# Patient Record
Sex: MALE | Race: WHITE
[De-identification: names, ages, dates, MRNs, and addresses within clinical notes are randomized per-mention and may not be internally consistent; named-entity substitution may affect disease eponyms.]

---

## 2020-08-10 ENCOUNTER — HOSPITAL ENCOUNTER (EMERGENCY)
Dept: HOSPITAL 97 - ER | Age: 40
Discharge: HOME | End: 2020-08-10
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
## 2020-08-10 DIAGNOSIS — S01.01XA: Primary | ICD-10-CM
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
## 2020-08-10 DIAGNOSIS — Z88.2: ICD-10-CM

## 2020-08-10 PROCEDURE — 72125 CT NECK SPINE W/O DYE: CPT

## 2020-08-10 PROCEDURE — 70450 CT HEAD/BRAIN W/O DYE: CPT

## 2020-08-10 PROCEDURE — 90714 TD VACC NO PRESV 7 YRS+ IM: CPT

## 2020-08-10 PROCEDURE — 0JQ00ZZ REPAIR SCALP SUBCUTANEOUS TISSUE AND FASCIA, OPEN APPROACH: ICD-10-PCS

## 2020-08-10 PROCEDURE — 90471 IMMUNIZATION ADMIN: CPT

## 2020-08-10 PROCEDURE — 99283 EMERGENCY DEPT VISIT LOW MDM: CPT

## 2020-08-10 NOTE — RAD REPORT
EXAM DESCRIPTION:  CT - [id number] - 8/10/2020 2:39 pm

 

CLINICAL HISTORY:  FALL, blunt force trauma to the head and neck

 

COMPARISON:  No comparisons

 

TECHNIQUE:  Axial 5 mm thick images of the head were obtained.  Axial 2 mm thick images of the cervic
al spine were obtained with sagittal and coronal reconstruction images generated and reviewed.

 

All CT scans are performed using dose optimization technique as appropriate and may include automated
 exposure control or mA/KV adjustment according to patient size.

 

FINDINGS:  No intracranial hemorrhage, mass, edema or acute intracranial finding. No suspicion for ac
[place] infarction. No extra-axial fluid collections. Mastoid air cells and paranasal sinuses are clear. 
No globe or orbit abnormality seen.

 

Cervical body height and alignment are normal. No disk space narrowing. No fracture or acute bony abn
ormality.  Central canal detail is inherently limited.

 

No paraspinal mass or hematoma.

 

Due to technical malfunction is of all thing the PACs imaging system and the [employer]
ng system images were not immediately available for review and reports could not be initially generat
ed.

 

IMPRESSION:  Negative CT head examination for acute or significant finding.

 

Negative CT cervical spine examination for acute or significant finding.

## 2020-08-11 NOTE — EDPHYS
Physician Documentation                                                                           

 [facility]                                                                 

Name: Rush Jenkins Jr                                                                            

Age: 40 yrs                                                                                       

Sex: Male                                                                                         

: 1980                                                                                   

MRN: [id number]                                                                                   

Arrival Date: 08/10/2020                                                                          

Time: 14:01                                                                                       

Account#: [id number]                                                                            

Bed 15                                                                                            

Private MD:                                                                                       

ED Physician Michael Parker                                                                         

HPI:                                                                                              

08/10                                                                                             

14:09 This 40 yrs old  Male presents to ER via Ambulatory with complaints of head    rn  

      injury/laceration.                                                                          

14:09 The patient or guardian reports injury, a laceration, pain. The complaints affect the   rn  

      top of head. Onset: The symptoms/episode began/occurred just prior to arrival. Severity     

      of symptoms: At their worst the symptoms were mild, in the emergency department the         

      symptoms are unchanged. The patient has not experienced similar symptoms in the past.       

      Reports at work, unknown number of empty pallets fell and hit him on head approx 2-3 ft     

      height above his head, no LOC, got a little dizzy, reports mild neck pain. Remembers        

      all events. NO vomiting or focal neurological complaints. NO headache. No blood             

      thinners. .                                                                                 

                                                                                                  

Historical:                                                                                       

- Allergies:                                                                                      

14:06 Sulfa (Sulfonamide Antibiotics);                                                        ss  

- Home Meds:                                                                                      

14:06 None [Active];                                                                          ss  

                                                                                                  

- Immunization history:: Adult Immunizations up to date.                                          

- Social history:: Smoking status: Patient denies any tobacco usage or history of.                

- Family history:: not pertinent.                                                                 

- Hospitalizations: : No recent hospitalization is reported.                                      

                                                                                                  

                                                                                                  

ROS:                                                                                              

14:09 Constitutional: Negative for fever, chills, and weight loss, Eyes: Negative for injury, rn  

      pain, redness, and discharge, Neck: + neck pain Cardiovascular: Negative for chest          

      pain, palpitations, and edema, Respiratory: Negative for shortness of breath, cough,        

      wheezing, and pleuritic chest pain, Abdomen/GI: Negative for abdominal pain, nausea,        

      vomiting, diarrhea, and constipation, Back: Negative for injury and pain, MS/Extremity:     

      Negative for injury and deformity, Skin: Negative for injury, rash, and discoloration,      

      Neuro: Negative for headache, weakness, numbness, tingling, and seizure.                    

                                                                                                  

Exam:                                                                                             

14:09 Constitutional:  This is a well developed, well nourished patient who is awake, alert,  rn  

      and in no acute distress. Head/Face:  2.5 inch superficial laceration top of scalp, no      

      active bleeding, no depression Eyes:  Pupils equal round and reactive to light,             

      extra-ocular motions intact.  Lids and lashes normal.  Conjunctiva and sclera are           

      non-icteric and not injected.  Cornea within normal limits.  Periorbital areas with no      

      swelling, redness, or edema. Neck:  Ccollar in place, no midline tenderness                 

      Respiratory:  Speaking full sentences.  No increased work of breathing, no retractions      

      or nasal flaring. Skin:  Warm, dry MS/ Extremity:  Pulses equal, no cyanosis.   Neuro:      

      Awake and alert, GCS 15                                                                     

                                                                                                  

Vital Signs:                                                                                      

14:01  / 90; Pulse 87; Resp 17; Temp 98.7(O); Pulse Ox 99% on R/A; Weight 124.74 kg;    ss  

      Height 5 ft. 8 in. (172.72 cm); Pain 5/10;                                                  

14:01 Body Mass Index 41.81 (124.74 kg, 172.72 cm)                                            ss  

                                                                                                  

[place] Coma Score:                                                                               

14:09 Eye Response: spontaneous(4). Verbal Response: oriented(5). Motor Response: obeys       rn  

      commands(6). Total: 15.                                                                     

14:39 Eye Response: spontaneous(4). Verbal Response: oriented(5). Motor Response: obeys       rn  

      commands(6). Total: 15.                                                                     

                                                                                                  

Laceration:                                                                                       

14:39 Wound Repair of 2.5cm ( 1.0in ) subcutaneous laceration to top of head. Distal          rn  

      neuro/vascular/tendon intact. Wound prep: Extensive cleansing by me, Wound explored.        

      Skin closed with 3 35 W Staples using staple gun. Patient tolerated well.                   

                                                                                                  

MDM:                                                                                              

14:01 Patient medically screened.                                                             rn  

14:39 Differential diagnosis: Contusion of Laceration of Intracranial bleed- Concussion. Data rn  

      reviewed: vital signs, nurses notes, radiologic studies, CT scan, and as a result, I        

      will discharge patient. Counseling: I had a detailed discussion with the patient and/or     

      guardian regarding: the historical points, exam findings, and any diagnostic results        

      supporting the discharge/admit diagnosis, radiology results, the need for outpatient        

      follow up, to return to the emergency department if symptoms worsen or persist or if        

      there are any questions or concerns that arise at home. Response to treatment: the          

      patient's symptoms have markedly improved after treatment, and as a result, I will          

      discharge patient. Special discussion: Based on the patient's history, exam and DX          

      evaluation, there is no indication for emergent intervention or inpatient TX. It is         

      understood by the patient/guardian that if the SXs persist or worsen they need to           

      return immediately for re-evaluation. I discussed with the patient/guardian in detail       

      that at this point there is no indication for admission to the hospital. It is              

      understood, however, that if the symptoms persist or worsen the patient needs to return     

      immediately for re-evaluation.                                                              

                                                                                                  

08/10                                                                                             

14:09 Order name: Wound Care                                                                  rn  

                                                                                                  

Administered Medications:                                                                         

14:18 Drug: Tetanus-Diphtheria Toxoid Adult 0.5 ml {: [employer]. Exp:         iw  

      2023. Lot #: [id number]. } Route: IM; Site: right deltoid;                                 

17:23 Follow up: Response: No adverse reaction                                                iw  

                                                                                                  

                                                                                                  

Disposition:                                                                                      

08/10/20 14:41 Discharged to Home. Impression: Laceration without foreign body of scalp,          

  Superficial injury of head.                                                                     

- Condition is Stable.                                                                            

- Discharge Instructions: Head Injury, Adult, Stitches, Staples, or Adhesive Wound                

  Closure.                                                                                        

                                                                                                  

- Work release form, Medication Reconciliation Form, Thank You Letter, Antibiotic                 

  Education, Prescription Opioid Use form.                                                        

- Follow up: Private Physician; When: 10 days; Reason: Wound Recheck, Staple/Suture               

  removal.                                                                                        

- Problem is new.                                                                                 

- Symptoms have improved.                                                                         

                                                                                                  

                                                                                                  

                                                                                                  

Signatures:                                                                                       

Sanjuana Beck RN                     RN                                                      

Michael Parker MD MD rn Smirch, Shelby, RN RN   ss                                                   

                                                                                                  

Corrections: (The following items were deleted from the chart)                                    

14:56 14:39 Wound Repair of 2.5cm ( 1.0in ) subcutaneous laceration to top of head. Distal    rn  

      neuro/vascular/tendon intact. Wound prep: Extensive cleansing by nurse, Wound explored.     

      Skin closed with 4 35 W Staples using staple gun. Patient tolerated well. rn                

15:30 14:41 08/10/2020 14:41 Discharged to Home. Impression: Laceration without foreign body  iw  

      of scalp; Superficial injury of head. Condition is Stable. Forms are Medication             

      Reconciliation Form, Thank You Letter, Antibiotic Education, Prescription Opioid Use.       

      Follow up: Private Physician; When: 10 days; Reason: Wound Recheck, Staple/Suture           

      removal. Problem is new. Symptoms have improved. rn                                         

                                                                                                  

**************************************************************************************************

## 2020-08-11 NOTE — ER
Nurse's Notes                                                                                     

 [facility]                                                                 

Name: Rush Jenkins Jr                                                                            

Age: 40 yrs                                                                                       

Sex: Male                                                                                         

: 1980                                                                                   

MRN: [id number]                                                                                   

Arrival Date: 08/10/2020                                                                          

Time: 14:01                                                                                       

Account#: [id number]                                                                            

Bed 15                                                                                            

Private MD:                                                                                       

Diagnosis: Laceration without foreign body of scalp;Superficial injury of head                    

                                                                                                  

Presentation:                                                                                     

08/10                                                                                             

14:01 Chief complaint: EMS states: Pallet fell onto top of head while at work prior to        ss  

      arrival. Laceration to top of head, bleeding controlled at this time. C/o mild neck         

      pain. C collar in place. [place] LOC. Coronavirus screen: Client denies travel out of the     

      [place] in the last 14 days. At this time, the client does not indicate any symptoms           

      associated with coronavirus-19. Ebola Screen: Patient denies exposure to infectious         

      person. Patient denies travel to an Ebola-affected area in the 21 days before illness       

      onset. Initial Sepsis Screen: Does the patient meet any 2 criteria? No. Patient's           

      initial sepsis screen is negative. Does the patient have a suspected source of              

      infection? No. Patient's initial sepsis screen is negative. Risk Assessment: Do you         

      want to hurt yourself or someone else? Patient reports no desire to harm self or            

      others. Onset of symptoms was August 10, 2020.                                              

14:01 Method Of Arrival: Ambulatory                                                           ss  

14:01 Acuity: TIMOTEO 4                                                                             

                                                                                                  

Triage Assessment:                                                                                

15:00 General: Appears in no apparent distress. Behavior is calm, cooperative.                iw  

                                                                                                  

Historical:                                                                                       

- Allergies:                                                                                      

14:06 Sulfa (Sulfonamide Antibiotics);                                                        ss  

- Home Meds:                                                                                      

14:06 None [Active];                                                                          ss  

                                                                                                  

- Immunization history:: Adult Immunizations up to date.                                          

- Social history:: Smoking status: Patient denies any tobacco usage or history of.                

- Family history:: not pertinent.                                                                 

- Hospitalizations: : No recent hospitalization is reported.                                      

                                                                                                  

                                                                                                  

Screenin:06 Abuse screen: Denies threats or abuse. Denies injuries from another. Nutritional        ss  

      screening: No deficits noted. Tuberculosis screening: Never had TB. Fall Risk None          

      identified.                                                                                 

                                                                                                  

Assessment:                                                                                       

14:20 General: Appears in no apparent distress. Behavior is calm, cooperative. Pain:          iw  

      Complains of pain in top of head. Neuro: Level of Consciousness is awake, alert, obeys      

      commands, Oriented to person, place, time, situation, Moves all extremities. Full           

      function. Cardiovascular: Patient's skin is warm and dry. Respiratory: Respiratory          

      effort is even, unlabored, Respiratory pattern is regular, symmetrical. Derm: Skin is       

      healthy with good turgor. Musculoskeletal: Range of motion: intact in all extremities.      

      Injury Description: Laceration sustained to top of head is full thickness, 0.5 to 2.5       

      cm long, was sustained 30-60 minutes ago. a small amount of bleeding noted at this time.    

                                                                                                  

Vital Signs:                                                                                      

14:01  / 90; Pulse 87; Resp 17; Temp 98.7(O); Pulse Ox 99% on R/A; Weight 124.74 kg;    ss  

      Height 5 ft. 8 in. (172.72 cm); Pain 5/10;                                                  

14:01 Body Mass Index 41.81 (124.74 kg, 172.72 cm)                                              

                                                                                                  

Diana Coma Score:                                                                               

14:09 Eye Response: spontaneous(4). Verbal Response: oriented(5). Motor Response: obeys       rn  

      commands(6). Total: 15.                                                                     

14:39 Eye Response: spontaneous(4). Verbal Response: oriented(5). Motor Response: obeys       rn  

      commands(6). Total: 15.                                                                     

                                                                                                  

ED Course:                                                                                        

14:01 Patient arrived in ED.                                                                  ss  

14:01 Michael Parker MD is Attending Physician.                                                rn  

14:01 Arm band placed on right wrist.                                                         ss  

14:03 Triage completed.                                                                       ss  

14:04 Sanjuana Beck, RN is Primary Nurse.                                                   iw  

14:06 Patient has correct armband on for positive identification. Bed in low position. Call   ss  

      light in reach.                                                                             

15:10 Assist provider with laceration repair on top of head that was between 2.6 to 7.5 cm    iw  

      using staples. Set up tray. Performed by Michael Parker MD Patient tolerated well. Patient     

      did not have IV access during this emergency room visit.                                    

                                                                                                  

Administered Medications:                                                                         

14:18 Drug: Tetanus-Diphtheria Toxoid Adult 0.5 ml {: [employer]. Exp:         iw  

      2023. Lot #: [id number]. } Route: IM; Site: right deltoid;                                 

17:23 Follow up: Response: No adverse reaction                                                iw  

                                                                                                  

                                                                                                  

Outcome:                                                                                          

14:41 Discharge ordered by MD.                                                                rn  

15:29 Discharged to home ambulatory.                                                          iw  

15:29 Condition: good                                                                             

15:29 Discharge instructions given to patient, Instructed on discharge instructions, follow       

      up and referral plans. wound care, Demonstrated understanding of instructions,              

      follow-up care, wound care.                                                                 

15:30 Patient left the ED.                                                                    iw  

                                                                                                  

Signatures:                                                                                       

Sanjuana Beck RN RN iw Nieto, Roman, MD MD rn Smirch, Shelby, RN RN ss                                                   

                                                                                                  

Corrections: (The following items were deleted from the chart)                                    

14:04 14:01 Acuity: TIMOTEO 3 ss                                                                  ss  

                                                                                                  

**************************************************************************************************

## 2021-02-18 ENCOUNTER — HOSPITAL ENCOUNTER (INPATIENT)
Dept: HOSPITAL 97 - ER | Age: 41
LOS: 1 days | Discharge: HOME | DRG: 343 | End: 2021-02-19
Attending: SURGERY | Admitting: SURGERY
Payer: SELF-PAY

## 2021-02-18 VITALS — BODY MASS INDEX: 45.6 KG/M2

## 2021-02-18 VITALS — OXYGEN SATURATION: 98 %

## 2021-02-18 DIAGNOSIS — K35.80: Primary | ICD-10-CM

## 2021-02-18 DIAGNOSIS — Z88.1: ICD-10-CM

## 2021-02-18 DIAGNOSIS — Z88.5: ICD-10-CM

## 2021-02-18 LAB
ALBUMIN SERPL BCP-MCNC: 4.2 G/DL (ref 3.4–5)
ALP SERPL-CCNC: 69 U/L (ref 45–117)
ALT SERPL W P-5'-P-CCNC: 134 U/L (ref 12–78)
AST SERPL W P-5'-P-CCNC: 58 U/L (ref 15–37)
BUN BLD-MCNC: 13 MG/DL (ref 7–18)
GLUCOSE SERPLBLD-MCNC: 93 MG/DL (ref 74–106)
HCT VFR BLD CALC: 46.9 % (ref 39.6–49)
LIPASE SERPL-CCNC: 77 U/L (ref 73–393)
LYMPHOCYTES # SPEC AUTO: 2 K/UL (ref 0.7–4.9)
PMV BLD: 8.4 FL (ref 7.6–11.3)
POTASSIUM SERPL-SCNC: 4.5 MMOL/L (ref 3.5–5.1)
RBC # BLD: 5.34 M/UL (ref 4.33–5.43)

## 2021-02-18 PROCEDURE — 85025 COMPLETE CBC W/AUTO DIFF WBC: CPT

## 2021-02-18 PROCEDURE — 88304 TISSUE EXAM BY PATHOLOGIST: CPT

## 2021-02-18 PROCEDURE — 0DTJ4ZZ RESECTION OF APPENDIX, PERCUTANEOUS ENDOSCOPIC APPROACH: ICD-10-PCS

## 2021-02-18 PROCEDURE — 82565 ASSAY OF CREATININE: CPT

## 2021-02-18 PROCEDURE — 83690 ASSAY OF LIPASE: CPT

## 2021-02-18 PROCEDURE — 99285 EMERGENCY DEPT VISIT HI MDM: CPT

## 2021-02-18 PROCEDURE — 94010 BREATHING CAPACITY TEST: CPT

## 2021-02-18 PROCEDURE — 80048 BASIC METABOLIC PNL TOTAL CA: CPT

## 2021-02-18 PROCEDURE — 36415 COLL VENOUS BLD VENIPUNCTURE: CPT

## 2021-02-18 PROCEDURE — 96374 THER/PROPH/DIAG INJ IV PUSH: CPT

## 2021-02-18 PROCEDURE — 74177 CT ABD & PELVIS W/CONTRAST: CPT

## 2021-02-18 PROCEDURE — 80076 HEPATIC FUNCTION PANEL: CPT

## 2021-02-18 RX ADMIN — FENTANYL CITRATE ONE MCG: 50 INJECTION, SOLUTION INTRAMUSCULAR; INTRAVENOUS at 16:40

## 2021-02-18 RX ADMIN — DEXTROSE AND SODIUM CHLORIDE SCH: 5; .45 INJECTION, SOLUTION INTRAVENOUS at 14:18

## 2021-02-18 RX ADMIN — FENTANYL CITRATE ONE MCG: 50 INJECTION, SOLUTION INTRAMUSCULAR; INTRAVENOUS at 16:48

## 2021-02-18 RX ADMIN — HYDROCODONE BITARTRATE AND ACETAMINOPHEN PRN TAB: 7.5; 325 TABLET ORAL at 21:05

## 2021-02-18 RX ADMIN — PIPERACILLIN SODIUM AND TAZOBACTAM SODIUM SCH MLS: 3; .375 INJECTION, POWDER, LYOPHILIZED, FOR SOLUTION INTRAVENOUS at 21:00

## 2021-02-18 RX ADMIN — DEXTROSE AND SODIUM CHLORIDE SCH MLS: 5; .45 INJECTION, SOLUTION INTRAVENOUS at 17:56

## 2021-02-18 NOTE — EDPHYS
Physician Documentation                                                                           

 [facility]                                                                 

Name: Rush Jenkins Jr                                                                            

Age: 40 yrs                                                                                       

Sex: Male                                                                                         

: 1980                                                                                   

MRN: [id number]                                                                                   

Arrival Date: 2021                                                                          

Time: 10:01                                                                                       

Account#: [id number]                                                                            

Bed 3                                                                                             

Private MD:                                                                                       

ED Physician Sandeep Main                                                                      

HPI:                                                                                              

                                                                                             

12:27 This 40 yrs old  Male presents to ER via Ambulatory with complaints of         jr8 

      Abdominal Pain, Nausea, Headache.                                                           

12:27 The patient presents with abdominal pain in the lower abdomen. Onset: The               jr8 

      symptoms/episode began/occurred acutely, today. The symptoms do not radiate. Associated     

      signs and symptoms: Pertinent positives: nausea. The symptoms are described as crampy.      

      Modifying factors: The symptoms are alleviated by nothing, the symptoms are aggravated      

      by nothing. Severity of pain: At its worst the pain was moderate in the emergency           

      department the pain is unchanged. The patient has not experienced similar symptoms in       

      the past. The patient has not recently seen a physician.                                    

                                                                                                  

Historical:                                                                                       

- Allergies:                                                                                      

10:22 Sulfa (Sulfonamide Antibiotics);                                                        ll1 

10:22 Morphine;                                                                               ll1 

- PMHx:                                                                                           

10:22 None;                                                                                   ll1 

- PSHx:                                                                                           

10:22 L arm fx-Plate placed;                                                                  ll1 

                                                                                                  

- Immunization history:: Flu vaccine is not up to date.                                           

- Social history:: Smoking status: Patient denies any tobacco usage or history of.                

                                                                                                  

                                                                                                  

ROS:                                                                                              

12:27 Eyes: Negative for injury, pain, redness, and discharge, ENT: Negative for injury,      jr8 

      pain, and discharge, Neck: Negative for injury, pain, and swelling, Cardiovascular:         

      Negative for chest pain, palpitations, and edema, Respiratory: Negative for shortness       

      of breath, cough, wheezing, and pleuritic chest pain, Back: Negative for injury and         

      pain, MS/Extremity: Negative for injury and deformity, Skin: Negative for injury, rash,     

      and discoloration, Neuro: Negative for headache, weakness, numbness, tingling, and          

      seizure.                                                                                    

12:27 Abdomen/GI: Positive for abdominal pain, nausea, Negative for vomiting, diarrhea,           

      constipation, abdominal distension, anorexia, dysphagia, hematemesis, black/tarry           

      stool, rectal pain, rectal bleeding, bowel incontinence, flatulence.                        

                                                                                                  

Exam:                                                                                             

12:27 Constitutional:  This is a well developed, well nourished patient who is awake, alert,  jr8 

      and in no acute distress. Cardiovascular:  Regular rate and rhythm with a normal S1 and     

      S2.  No gallops, murmurs, or rubs.  Normal PMI, no JVD.  No pulse deficits.                 

      Respiratory:  Lungs have equal breath sounds bilaterally, clear to auscultation and         

      percussion.  No rales, rhonchi or wheezes noted.  No increased work of breathing, no        

      retractions or nasal flaring. Back:  No spinal tenderness.  No costovertebral               

      tenderness.  Full range of motion. Skin:  Warm, dry with normal turgor.  Normal color       

      with no rashes, no lesions, and no evidence of cellulitis. MS/ Extremity:  Pulses           

      equal, no cyanosis.  Neurovascular intact.  Full, normal range of motion. Neuro:  Awake     

      and alert, GCS 15, oriented to person, place, time, and situation.  Cranial nerves          

      II-XII grossly intact.  Motor strength 5/5 in all extremities.  Sensory grossly intact.     

                                                                                                  

12:27 Abdomen/GI: Inspection: obese Bowel sounds: active, all quadrants, Palpation: soft, in      

      all quadrants, mild abdominal tenderness, in the right lower quadrant and left lower        

      quadrant, mass, is not appreciated, rebound tenderness, is not appreciated, voluntary       

      guarding, is not appreciated, involuntary guarding, is not appreciated, no appreciated      

      organomegaly, Indicators: McBurney's point is not tender, Casanova's sign is negative,        

      Rovsing's sign is negative, Liver: tenderness, is not appreciated.                          

                                                                                                  

Vital Signs:                                                                                      

10:18  / 105; Pulse 72; Resp 17; Temp 98.4; Pulse Ox 99% ; Weight 136.08 kg; Height 5   ll1 

      ft. 8 in. (172.72 cm); Pain 7/10;                                                           

12:00  / 101; Pulse 67; Resp 16; Pulse Ox 96% on R/A;                                   sv  

10:18 Body Mass Index 45.61 (136.08 kg, 172.72 cm)                                            ll1 

                                                                                                  

MDM:                                                                                              

11:30 Patient medically screened.                                                             jonas 

12:50 Data reviewed: vital signs, nurses notes, lab test result(s), radiologic studies, CT    jr8 

      scan. Data interpreted: Pulse oximetry: on room air is 96 %. Interpretation: normal.        

      Counseling: I had a detailed discussion with the patient and/or guardian regarding: the     

      historical points, exam findings, and any diagnostic results supporting the                 

      discharge/admit diagnosis, lab results, radiology results, the need for further work-up     

      and treatment in the hospital. ED course: Dr. Meier contacted and will see patient        

      for surgery .                                                                               

                                                                                                  

                                                                                             

11:45 Order name: Basic Metabolic Panel                                                       jr8 

                                                                                             

11:45 Order name: CBC with Diff; Complete Time: 12:06                                         jr8 

                                                                                             

11:45 Order name: Hepatic Function; Complete Time: 12:19                                      jr8 

                                                                                             

11:45 Order name: Lipase; Complete Time: 12:19                                                jr8 

                                                                                             

11:45 Order name: Basic Metabolic Panel; Complete Time: 12:19                                 EDMS

                                                                                             

11:46 Order name: CT Abd/Pelvis - IV Contrast Only; Complete Time: 12:50                      jr8 

                                                                                             

11:45 Order name: IV Saline Lock; Complete Time: 11:56                                        8 

                                                                                             

11:45 Order name: Labs collected and sent; Complete Time: 11:56                               [facility] 

                                                                                                  

Administered Medications:                                                                         

13:19 Drug: Zosyn 3.375 grams Route: IVPB; Infused Over: 60 mins; Site: right antecubital;    sv  

                                                                                                  

                                                                                                  

Disposition:                                                                                      

                                                                                             

05:39 Co-signature as Attending Physician, Sandeep Main MD I agree with the assessment and  jonas 

      plan of care.                                                                               

                                                                                                  

Disposition:                                                                                      

21 12:51 Hospitalization ordered by Camilo Meier for Observation. Preliminary             

  diagnosis is Acute appendicitis.                                                                

- Bed requested for Telemetry/MedSurg (observation).                                              

- Status is Observation.                                                                      sv  

- Condition is Stable.                                                                            

- Problem is new.                                                                                 

- Symptoms are unchanged.                                                                         

                                                                                                  

                                                                                                  

                                                                                                  

Signatures:                                                                                       

Dispatcher [employer]                           [facility]                                                 

Idalia Watkins RN RN sv Anderson, Corey, MD MD cha Roszak, Josh, PA PA   jr8                                                  

Ryley Estrada RN                       RN   ll1                                                  

                                                                                                  

Corrections: (The following items were deleted from the chart)                                    

                                                                                             

13:46 12:51 Hospitalization Ordered by Camilo Meier MD for Observation. Preliminary          sv  

      diagnosis is Acute appendicitis. Bed requested for Telemetry/MedSurg (observation).         

      Status is Observation. Condition is Stable. Problem is new. Symptoms are unchanged. jr8     

                                                                                                  

**************************************************************************************************

## 2021-02-18 NOTE — ER
Nurse's Notes                                                                                     

 [facility]                                                                 

Name: Rush Jenkins Jr                                                                            

Age: 40 yrs                                                                                       

Sex: Male                                                                                         

: 1980                                                                                   

MRN: [id number]                                                                                   

Arrival Date: 2021                                                                          

Time: 10:01                                                                                       

Account#: [id number]                                                                            

Bed 3                                                                                             

Private MD:                                                                                       

Diagnosis: Acute appendicitis                                                                     

                                                                                                  

Presentation:                                                                                     

                                                                                             

10:18 Chief complaint: Patient states: Abd pain for 2 days, lower abdomen today with nausea.  ll1 

      HA off/on a few days. No fever. Coronavirus screen: Client denies travel out of the         

      [place] in the last 14 days. Coronavirus screen: Ebola Screen: Patient denies travel to an     

      Ebola-affected area in the 21 days before illness onset. Initial Sepsis Screen: Does        

      the patient meet any 2 criteria? No. Patient's initial sepsis screen is negative. Does      

      the patient have a suspected source of infection? Yes: Acute abdominal pain. Risk           

      Assessment: Do you want to hurt yourself or someone else? Patient reports no desire to      

      harm self or others. Onset of symptoms was 2021.                               

10:18 Method Of Arrival: Ambulatory                                                           ll1 

10:18 Acuity: TIMOTEO 3                                                                           ll1 

                                                                                                  

Historical:                                                                                       

- Allergies:                                                                                      

10:22 Sulfa (Sulfonamide Antibiotics);                                                        ll1 

10:22 Morphine;                                                                               ll1 

- PMHx:                                                                                           

10:22 None;                                                                                   ll1 

- PSHx:                                                                                           

10:22 L arm fx-Plate placed;                                                                  ll1 

                                                                                                  

- Immunization history:: Flu vaccine is not up to date.                                           

- Social history:: Smoking status: Patient denies any tobacco usage or history of.                

                                                                                                  

                                                                                                  

Screenin:30 Abuse screen: Denies threats or abuse. Denies injuries from another. Nutritional        sv  

      screening: No deficits noted. Tuberculosis screening: No symptoms or risk factors           

      identified. Fall Risk None identified.                                                      

                                                                                                  

Assessment:                                                                                       

11:40 General: Appears in no apparent distress. comfortable, obese, well groomed, well        sv  

      developed, well nourished, Behavior is calm, cooperative, appropriate for age. Pain:        

      Complains of pain in abdomen Pain currently is 7 out of 10 on a pain scale. Neuro:          

      Level of Consciousness is awake, alert, obeys commands, Oriented to person, place,          

      time, situation, Moves all extremities. Full function Gait is steady. Respiratory:          

      Respiratory effort is even, unlabored, Respiratory pattern is regular, symmetrical. GI:     

      Abdomen is obese, Abd is soft X 4 quads. Derm: Skin is intact, Skin is pink, warm \T\       

      dry. Musculoskeletal: Range of motion: intact in all extremities.                           

13:19 Reassessment: Patient appears in no apparent distress at this time. No changes from     sv  

      previously documented assessment. Patient and/or family updated on plan of care and         

      expected duration. Pain level reassessed. Patient is alert, oriented x 3, equal             

      unlabored respirations, skin warm/dry/pink.                                                 

13:45 Reassessment: Patient appears in no apparent distress at this time. No changes from     sv  

      previously documented assessment. Patient and/or family updated on plan of care and         

      expected duration. Pain level reassessed. Patient is alert, oriented x 3, equal             

      unlabored respirations, skin warm/dry/pink.                                                 

                                                                                                  

Vital Signs:                                                                                      

10:18  / 105; Pulse 72; Resp 17; Temp 98.4; Pulse Ox 99% ; Weight 136.08 kg; Height 5   ll1 

      ft. 8 in. (172.72 cm); Pain 7/10;                                                           

12:00  / 101; Pulse 67; Resp 16; Pulse Ox 96% on R/A;                                   sv  

10:18 Body Mass Index 45.61 (136.08 kg, 172.72 cm)                                            ll1 

                                                                                                  

ED Course:                                                                                        

10:01 Patient arrived in ED.                                                                  as  

10:21 Triage completed.                                                                       ll1 

10:22 Arm band placed on.                                                                     ll1 

11:30 Idalia Watkins RN is Primary Nurse.                                                  sv  

11:30 Sandeep Main MD is Attending Physician.                                             [facility] 

11:30 Jaya Suarez PA is PHCP.                                                               [id number] 

11:30 Patient has correct armband on for positive identification. Placed in gown. Bed in low  sv  

      position. Call light in reach. Pulse ox on. NIBP on. Door closed. Head of bed elevated.     

11:42 Nurse Practitioner and/or Physician Assistant to see patient.                           sv  

11:45 Inserted saline lock: 22 gauge in right antecubital area, using aseptic technique.      sv  

      ,using aseptic technique. diffusics, done by [facility] Blood collected.                    

11:56 Basic Metabolic Panel Sent.                                                             sv  

12:11 CT Abd/Pelvis - IV Contrast Only In Process Unspecified.                                EDMS

12:51 Camilo Meier MD is Hospitalizing Provider.                                           [id number] 

13:45 No provider procedures requiring assistance completed. Patient admitted, IV remains in  sv  

      place. intact.                                                                              

                                                                                                  

Administered Medications:                                                                         

13:19 Drug: Zosyn 3.375 grams Route: IVPB; Infused Over: 60 mins; Site: right antecubital;    sv  

                                                                                                  

                                                                                                  

Outcome:                                                                                          

12:51 Decision to Hospitalize by Provider.                                                    una 

13:45 Admitted to OR accompanied by nurse, via wheelchair, with chart, Report called to       michelle Friedman RN                                                                                

13:45 Condition: stable                                                                           

13:45 Instructed on the need for admit.                                                           

13:46 Patient left the ED.                                                                      

                                                                                                  

Signatures:                                                                                       

Dispatcher [employer]                           EDIdalia Helton, RN                    RN   Sandeep Garcia MD MD cha Martinez, Amelia as Roszak, Josh, PA                        PA   [id number]                                                  

Ryley Estrada, VASHTI                       RN   ll1                                                  

                                                                                                  

**************************************************************************************************

## 2021-02-18 NOTE — P.HP
Date of Service: 02/18/21





PC:  This 40-year-old male presents to the emergency room with severe right 

lower quadrant abdominal pain for diagnosis and treatment.





HPC:  Patient began to feel uncomfortable yesterday evening.  Pain intensified 

throughout the night.  Today he fatty could hardly walk and had located to the 

right lower quadrant.





PMH:  Negative





PSHx:  Denies any prior history is





SOC:  States is allergic to sulfa and morphine





SYS REVIEW:  No cough, some wheeze, shortness of breath.  No chest pain or 

palpitations.  Denies any urinary complaints





O/E awake alert comfortable at the moment








HEENT:  Not jaundice





Chest:  Chest movement equal bilateral





ABD:  Saw does have guarding with mild rebound in the right lower quadrant





LOCO:  Intact





DATA:  CT scan supports clinical diagnosis of acute abdomen with appendicitis





IMPRESSION:  Acute abdomen with appendicitis





PLAN:  I will take him the operating room for laparoscopic possible open ap

pendectomy.  The risks of this procedure have been discussed.  The possibility 

of bleeding, infection, injury to bowel and surrounding structures were 

explained.  The possibility abscess formation and need for further surgeries and

procedures was described.  He understands and wants us to proceed.

## 2021-02-18 NOTE — RAD REPORT
EXAM DESCRIPTION:  CT - Abdomen   Pelvis W Contrast - 2/18/2021 12:11 pm

 

CLINICAL HISTORY:  ABD PAIN

 

COMPARISON:  No comparisons

 

TECHNIQUE:  Biphasic, helical CT imaging of the abdomen and pelvis was performed following 100 ml non
-ionic IV contrast.

 

No oral contrast administered.

 

All CT scans are performed using dose optimization technique as appropriate and may include automated
 exposure control or mA/KV adjustment according to patient size.

 

FINDINGS:  No suspicious findings in the lung bases.

 

Patient has diffuse fatty infiltration of the liver with no focal liver lesion. No Gallbladder and bi
liary tree are also without suspicious finding.

 

Symmetric renal function is seen with no hydronephrosis or suspicious renal mass. No pyelonephritis o
r acute parenchymal process. No bladder abnormalities. No adrenal abnormalities.

 

No gastric dilatation or wall thickening. No small bowel abnormality. Appendicolith is present. Diame
ter of the appendix is 10-14 mm and the walls of the appendix are slightly thickened and edematous. N
o significant periappendiceal inflammatory stranding seen. Findings are still suspicious for early ap
pendicitis.

 

  No free air, free fluid or pneumatosis. No areas of focal inflammatory stranding seen.  No mass or 
bulky lymphadenopathy. Periumbilical fat only hernia present.

 

No suspicious bony findings.

 

 

IMPRESSION:  The appendix is dilated and contains an appendicolith. There is no measurable periappend
iceal stranding. However, findings are suspicious for early appendicitis.

 

Diffuse fatty infiltration of the liver. No other acute or suspicious findings.

## 2021-02-18 NOTE — P.OP
Preoperative diagnosis: Acute abdomen with appendicitis


Postoperative diagnosis: The same


Primary procedure: Laparoscopic appy and


Secondary procedure: Leonardo block


Anesthesia: General


Estimated blood loss: Less than 10 cc


Specimen: 1 gallbladder and contents


Findings: Acutely inflamed appendix


Operative Technique: 





The patient brought the operating room placed supine on the table. After the 

induction of adequate general endotracheal anesthesia, there the abdomen was 

prepped with a DuraPrep solution, and he was draped in usual aseptic manner.





Subumbilical incision was made. This was brought down through the skin and 

subcutaneous tissue. The Visiport was used to enter the peritoneal cavity and 

created pneumoperitoneum to approximately 12 mm of mercury.  Under direct vision

a 5 mm trocar was placed in the right upper quadrant, and another in the lower 

midline. We could visualize the intestinal contents.  With the patient placed in

reverse Trendelenburg and rolled to his left. The right lower quadrant was 

visible.  The appendix was not easily seen.  It was grasped we could see its 

attachment to the cecum.  The appendix was obviously acutely inflamed.  An 

opening was made in the window of the appendix mesentery. The linear Stapler was

now introduced into the peritoneal cavity after converting our 10 mm to a 12 at 

the emboli kiss.  The cartridge placed across the base of the appendix as 

junction with the cecum.  The instrument was fired.  At this point a vascular 

reload was taken in place across the mesentery of the appendix.  The instrument 

was fired.  The appendix is now been attached.  It was placed into an Endo-Catch

and brought out through the umbilical trocar site.





Attention was now turned towards the right upper quadrant. Using 0.25% Marcaine.

A TA PP block was done.  Attention was turned towards right lower quadrant. 

Adequate hemostasis had been ensured. Attention was now turned towards the 

emboli kiss.  With the trocar removed we were now able approximate the fashion 

this area using the Endo Close an absorbable suture. A small amount of 

mesentery, was adherent in this area. It was gently taken down using a grasper. 

At this point the pneumoperitoneum was collapsed, the trocars removed, and the 

suture tied. Staples were then applied to the skin At the end of procedure he 

was in a stable condition when sent to the recovery room. Needle sponge 

instrument count were correct. No drains were placed.


Transferred to: Recovery Room


Condition: Good

## 2021-02-19 VITALS — TEMPERATURE: 100.1 F | SYSTOLIC BLOOD PRESSURE: 136 MMHG | DIASTOLIC BLOOD PRESSURE: 71 MMHG

## 2021-02-19 RX ADMIN — HYDROCODONE BITARTRATE AND ACETAMINOPHEN PRN TAB: 7.5; 325 TABLET ORAL at 09:49

## 2021-02-19 RX ADMIN — PIPERACILLIN SODIUM AND TAZOBACTAM SODIUM SCH MLS: 3; .375 INJECTION, POWDER, LYOPHILIZED, FOR SOLUTION INTRAVENOUS at 00:43

## 2021-02-19 RX ADMIN — PIPERACILLIN SODIUM AND TAZOBACTAM SODIUM SCH: 3; .375 INJECTION, POWDER, LYOPHILIZED, FOR SOLUTION INTRAVENOUS at 17:00

## 2021-02-19 RX ADMIN — DEXTROSE AND SODIUM CHLORIDE SCH MLS: 5; .45 INJECTION, SOLUTION INTRAVENOUS at 05:42

## 2021-02-19 RX ADMIN — DEXTROSE AND SODIUM CHLORIDE SCH: 5; .45 INJECTION, SOLUTION INTRAVENOUS at 10:18

## 2021-02-19 RX ADMIN — PIPERACILLIN SODIUM AND TAZOBACTAM SODIUM SCH MLS: 3; .375 INJECTION, POWDER, LYOPHILIZED, FOR SOLUTION INTRAVENOUS at 09:50

## 2021-02-19 RX ADMIN — HYDROCODONE BITARTRATE AND ACETAMINOPHEN PRN TAB: 7.5; 325 TABLET ORAL at 04:56

## 2021-02-19 NOTE — P.DS
Admission Date: 02/19/21


Discharge Date: 02/19/21


Disposition: ROUTINE DISCHARGE


Discharge Condition: GOOD


Procedures: 





Laparoscopic appendectomy


Brief History of Present Illness: 





Patient presents emergency room with severe right lower quadrant abdominal pain 

for diagnosis and treatment.


Hospital Course: 





Patient was evaluated emergency room. He was found have early appendicitis.  He 

was admitted for observation pain control. He was brought to the operating room 

Re underwent a laparoscopic appendectomy.  He tolerated the procedure well. 

Today he is up ambulating, tolerating a diet, and voiding on his own.  He is 

deemed fit for discharge.


Vital Signs/Physical Exam: 














Temp Pulse Resp BP Pulse Ox


 


 98.5 F   84   17   121/65   95 


 


 02/19/21 12:00  02/19/21 12:00  02/19/21 12:00  02/19/21 12:00  02/19/21 12:00








Laboratory Data at Discharge: 














WBC  8.50 K/uL (4.3-10.9)   02/18/21  11:45    


 


Hgb  16.2 g/dL (13.6-17.9)   02/18/21  11:45    


 


Hct  46.9 % (39.6-49.0)   02/18/21  11:45    


 


Plt Count  258 K/uL (152-406)   02/18/21  11:45    


 


Sodium  137 mmol/L (136-145)   02/18/21  11:45    


 


Potassium  4.5 mmol/L (3.5-5.1)   02/18/21  11:45    


 


BUN  13 mg/dL (7-18)   02/18/21  11:45    


 


Creatinine  1.23 mg/dL (0.55-1.3)   02/18/21  11:45    


 


Glucose  93 mg/dL ()   02/18/21  11:45    


 


Total Bilirubin  1.3 mg/dL (0.2-1.0)  H  02/18/21  11:45    


 


AST  58 U/L (15-37)  H  02/18/21  11:45    


 


ALT  134 U/L (12-78)  H  02/18/21  11:45    


 


Alkaline Phosphatase  69 U/L ()   02/18/21  11:45    


 


Lipase  77 U/L ()   02/18/21  11:45    








Home Medications: 








NK [No Home Meds]  02/18/21 





Physician Discharge Instructions: 


Soft mechanical diet at home, advanced as tolerated.  Milk of magnesium p.r.n. 

constipation.  He may shower, change dressings as needed.  Any questions or 

problems, go to the emergency room, or contact me.  Call Monday for a once a 

appointment to have the staples removed. Any questions or problems, go to the 

emergency room, or contact me.


Diet: Regular


Activity: Ad leta


Followup: 


NONE,NONE [Primary Care Provider] -

## 2021-02-19 NOTE — P.PN
Date of Service: 02/19/21





S:  Patient is better today, has been up ambulating, tolerating a diet, voiding 

on his own.





O:  Vital signs are stable, incisions are clean





A:  Surgically stable





P:  I discussed the surgical findings with the patient. We explained the have 

about a small umbilical hernia that was approximated using a suture. He has been

advised on his diet, he will ambulated home, he may shower as needed.  Should he

have any questions or problems in will return to emergency room. Otherwise he 

has will call for appointment at my office for next Wednesday.  He understands 

and wants to proceed.  He has declined pain medicine as he does not like the 

feeling of when he takes narcotics able use the there acetaminophen or ibuprofen

at home.